# Patient Record
Sex: MALE | Race: WHITE
[De-identification: names, ages, dates, MRNs, and addresses within clinical notes are randomized per-mention and may not be internally consistent; named-entity substitution may affect disease eponyms.]

---

## 2018-04-18 ENCOUNTER — HOSPITAL ENCOUNTER (EMERGENCY)
Dept: HOSPITAL 17 - NEPE | Age: 63
Discharge: HOME | End: 2018-04-18
Payer: SELF-PAY

## 2018-04-18 VITALS
HEART RATE: 79 BPM | RESPIRATION RATE: 18 BRPM | OXYGEN SATURATION: 97 % | SYSTOLIC BLOOD PRESSURE: 135 MMHG | DIASTOLIC BLOOD PRESSURE: 94 MMHG

## 2018-04-18 VITALS
RESPIRATION RATE: 20 BRPM | SYSTOLIC BLOOD PRESSURE: 106 MMHG | DIASTOLIC BLOOD PRESSURE: 67 MMHG | OXYGEN SATURATION: 97 % | HEART RATE: 94 BPM | TEMPERATURE: 97.8 F

## 2018-04-18 VITALS — BODY MASS INDEX: 22.72 KG/M2 | HEIGHT: 70 IN | WEIGHT: 158.73 LBS

## 2018-04-18 VITALS
RESPIRATION RATE: 26 BRPM | DIASTOLIC BLOOD PRESSURE: 87 MMHG | SYSTOLIC BLOOD PRESSURE: 132 MMHG | HEART RATE: 73 BPM | OXYGEN SATURATION: 98 %

## 2018-04-18 DIAGNOSIS — F17.210: ICD-10-CM

## 2018-04-18 DIAGNOSIS — J44.1: Primary | ICD-10-CM

## 2018-04-18 PROCEDURE — 71046 X-RAY EXAM CHEST 2 VIEWS: CPT

## 2018-04-18 PROCEDURE — 93005 ELECTROCARDIOGRAM TRACING: CPT

## 2018-04-18 PROCEDURE — 94664 DEMO&/EVAL PT USE INHALER: CPT

## 2018-04-18 PROCEDURE — 99284 EMERGENCY DEPT VISIT MOD MDM: CPT

## 2018-04-18 NOTE — PD
HPI


Chief Complaint:  Respiratory Symptoms


Time Seen by Provider:  17:39


Travel History


International Travel<30 days:  No


Contact w/Intl Traveler<30days:  No


Traveled to known affect area:  No





History of Present Illness


HPI


This is a 62-year-old man who presents to the emergency department complaining 

of worsening shortness of breath and cough with productive sputum ongoing for 

the past 3 months or so.'s been told he had COPD before.  No fevers.  He has 

had some phlegm.  Still smokes.  Would like help to quit smoking.  Has 

otherwise been feeling generally well.





History


Past Medical History


*** Narrative Medical


COPD


Tobacco use





Past Surgical History


Surgical History:  No Previous Surgery





Social History


Alcohol Use:  No


Tobacco Use:  Yes (1 PPD)





Allergies-Medications


(Allergen,Severity, Reaction):  


Coded Allergies:  


     No Known Allergies (Verified  Adverse Reaction, Unknown, 4/18/18)


Reported Meds & Prescriptions





Reported Meds & Active Scripts


Active


No Active Prescriptions or Reported Medications    








Review of Systems


Except as stated in HPI:  all other systems reviewed are Neg





Physical Exam


Narrative


GENERAL: 62-year-old man, no acute distress.


SKIN: Focused skin assessment warm/dry.


HEAD: Atraumatic. Normocephalic. 


EYES: Pupils equal and round. No scleral icterus. No injection or drainage. 


ENT: No nasal bleeding or discharge.  Mucous membranes pink and moist.


NECK: Trachea midline. No JVD. 


CARDIOVASCULAR: Regular rate and rhythm.  No murmur appreciated.


RESPIRATORY: Diminished breath sounds with rhonchi and wheezing throughout.


GASTROINTESTINAL: Abdomen soft, non-tender, nondistended. Hepatic and splenic 

margins not palpable. 


MUSCULOSKELETAL: No obvious deformities.  No edema.


NEUROLOGICAL: Awake and alert. No obvious cranial nerve deficits.  Motor 

grossly within normal limits. Normal speech.


PSYCHIATRIC: Appropriate mood and affect; insight and judgment normal.





Data


Data


Last Documented VS





Vital Signs








  Date Time  Temp Pulse Resp B/P (MAP) Pulse Ox O2 Delivery O2 Flow Rate FiO2


 


4/18/18 18:04  73 26 132/87 (102) 98 Room Air  


 


4/18/18 16:44 97.8       








Orders





 Orders


Chest, Pa & Lat (4/18/18 16:45)


Albuterol-Ipratropium Neb (Duoneb Neb) (4/18/18 18:30)


Prednisone (Deltasone) (4/18/18 18:30)


Azithromycin (Zithromax) (4/18/18 18:30)








[facility]


Medical Decision Making


Medical Screen Exam Complete:  Yes


Emergency Medical Condition:  Yes


Interpretation(s)


Chest x-ray negative





My review of EKG: Normal sinus rhythm at a rate of 71, normal axis, normal 

intervals, no acute ischemia.


Differential Diagnosis


COPD, bronchitis, pneumonia, URI, other


Narrative Course


Medical decision making





62-year-old man presents to the emergency department complaining of chest pain, 

trouble breathing, productive cough, ongoing for 3 months, worse over the past 

couple days.  Is likely COPD exacerbation.  He also would like help quit 

smoking.  We will give him the [place] quit line information.





Diagnosis





 Primary Impression:  


 COPD exacerbation





***Additional Instructions:  


Take antibiotics as prescribed.





Use albuterol inhaler every 4-6 hours as needed.





Take steroids as prescribed.





Return to the emergency department for any worsening chest pain, trouble 

breathing, or any other new or worsening symptoms.


***Med/Other Pt SpecificInfo:  Prescription(s) given


Scripts


Azithromycin (Azithromycin) 250 Mg Tab


250 MG PO DAILY for Infection for 4 Days, #4 TAB 0 Refills


   Prov: Ish Roberto MD         4/18/18 


Albuterol 18 GM Inh (Ventolin Hfa 18 GM Inh) 90 Mcg/Act Aer


2 PUFF INH Q4-6H Y for SHORTNESS OF BREATH, #1 INHALER 0 Refills


   Prov: Ish Roberto MD         4/18/18 


Prednisone (Deltasone) 20 Mg Tab


60 MG PO DAILY, #30 TAB 0 Refills


   Prov: Ish Roberto MD         4/18/18


Disposition:  01 DISCHARGE HOME


Condition:  Stable











Ish Roberto MD Apr 18, 2018 18:57

## 2018-04-18 NOTE — RADRPT
EXAM DATE/TIME:  04/18/2018 17:05 

 

[facility] COMPARISON:     

CHEST PA & LAT, October 23, 2016, 15:07.

 

                     

INDICATIONS :     

Short of breath for 3 weeks.  Cough for 3 months.

                     

 

MEDICAL HISTORY :     

None.          

 

SURGICAL HISTORY :     

None.   

 

ENCOUNTER:     

Initial                                        

 

ACUITY:     

3 months      

 

PAIN SCORE:     

2/10

 

LOCATION:     

Bilateral chest 

 

FINDINGS:     

PA and lateral views of the chest demonstrate the lungs to be moderately hyperinflated without eviden
ce of mass, infiltrate or effusion.  The cardiomediastinal contours are unremarkable.  Trace blunting
 of the costophrenic sulci Osseous structures are intact.

 

CONCLUSION:     

Moderate hyperinflation, negative for acute process

 

 

 

 Keaton Arndt MD FACR on April 18, 2018 at 17:25           

Board Certified Radiologist.

 This report was verified electronically.

## 2018-04-19 NOTE — EKG
Date Performed: 04/18/2018       Time Performed: 17:55:09

 

PTAGE:      62 years

 

EKG:      Sinus rhythm 

 

 NORMAL ECG 

 

NO PREVIOUS TRACING            

 

DOCTOR:   Kris Esparza  Interpretating Date/Time  04/19/2018 13:26:38